# Patient Record
Sex: FEMALE | Race: WHITE | NOT HISPANIC OR LATINO | Employment: OTHER | URBAN - METROPOLITAN AREA
[De-identification: names, ages, dates, MRNs, and addresses within clinical notes are randomized per-mention and may not be internally consistent; named-entity substitution may affect disease eponyms.]

---

## 2020-09-22 ENCOUNTER — TELEPHONE (OUTPATIENT)
Dept: GASTROENTEROLOGY | Facility: CLINIC | Age: 80
End: 2020-09-22

## 2020-09-22 NOTE — TELEPHONE ENCOUNTER
Vilma received from Dr. Donald for small-bowel erosions and history of iron deficiency anemia in the setting of intermittent melena.  EGD and colonoscopy 08/11/2020.  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  H pylori was negative from gastric sampling.  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  Last blood counts with hemoglobin 9.5 in June.  VCE done 09/03/2020 revealed erosions in the proximal small bowel.  Unclear if patient taking NSAIDs.  She is on Plavix for history of CVA.  No latex allergy mention in notes.    I recommend getting a copy of the VCE, if possible.  Would be best to get update with most recent blood count and evaluation in clinic.

## 2020-10-19 ENCOUNTER — TELEPHONE (OUTPATIENT)
Dept: GASTROENTEROLOGY | Facility: CLINIC | Age: 80
End: 2020-10-19

## 2020-10-19 NOTE — TELEPHONE ENCOUNTER
----- Message from Nichole Hill sent at 10/19/2020  3:10 PM CDT -----  Regarding: Appointment  Contact: [phone]  Calling in regards to speaking with nurse or provider to schedule appointment per referral. Please call

## 2021-06-04 ENCOUNTER — TELEPHONE (OUTPATIENT)
Dept: ENDOSCOPY | Facility: HOSPITAL | Age: 81
End: 2021-06-04

## 2021-06-07 ENCOUNTER — TELEPHONE (OUTPATIENT)
Dept: ENDOSCOPY | Facility: HOSPITAL | Age: 81
End: 2021-06-07

## 2021-06-18 ENCOUNTER — TELEPHONE (OUTPATIENT)
Dept: GASTROENTEROLOGY | Facility: CLINIC | Age: 81
End: 2021-06-18